# Patient Record
Sex: FEMALE | Race: WHITE | URBAN - METROPOLITAN AREA
[De-identification: names, ages, dates, MRNs, and addresses within clinical notes are randomized per-mention and may not be internally consistent; named-entity substitution may affect disease eponyms.]

---

## 2017-04-04 ENCOUNTER — OFFICE VISIT (OUTPATIENT)
Dept: ORTHOPEDIC SURGERY | Age: 42
End: 2017-04-04

## 2017-04-04 VITALS
HEART RATE: 62 BPM | DIASTOLIC BLOOD PRESSURE: 60 MMHG | BODY MASS INDEX: 23.57 KG/M2 | TEMPERATURE: 98.1 F | HEIGHT: 63 IN | SYSTOLIC BLOOD PRESSURE: 96 MMHG | WEIGHT: 133 LBS

## 2017-04-04 DIAGNOSIS — S53.105A ELBOW DISLOCATION, LEFT, INITIAL ENCOUNTER: Primary | ICD-10-CM

## 2017-04-04 DIAGNOSIS — S52.042A CLOSED DISPLACED FRACTURE OF CORONOID PROCESS OF LEFT ULNA, INITIAL ENCOUNTER: ICD-10-CM

## 2017-04-04 PROCEDURE — 99204 OFFICE O/P NEW MOD 45 MIN: CPT | Performed by: ORTHOPAEDIC SURGERY

## 2023-07-11 ENCOUNTER — HOSPITAL ENCOUNTER (OUTPATIENT)
Dept: WOMENS IMAGING | Age: 48
Discharge: HOME | End: 2023-07-11

## 2023-07-11 DIAGNOSIS — Z12.31 BREAST CANCER SCREENING BY MAMMOGRAM: ICD-10-CM

## 2023-07-11 PROCEDURE — 77067 SCR MAMMO BI INCL CAD: CPT

## 2023-09-28 ENCOUNTER — TELEPHONE (OUTPATIENT)
Dept: SURGERY | Age: 48
End: 2023-09-28

## 2023-09-28 NOTE — TELEPHONE ENCOUNTER
Left VM for patient to return call to review intake and confirm appointment for 10/2/23 at 11 am in our [place] office. Also asked patient to arrive 15 minutes before appointment time if unavailable to return call.

## 2023-10-02 ENCOUNTER — OFFICE VISIT (OUTPATIENT)
Dept: SURGERY | Age: 48
End: 2023-10-02

## 2023-10-02 VITALS
RESPIRATION RATE: 18 BRPM | HEIGHT: 63 IN | WEIGHT: 148.4 LBS | HEART RATE: 60 BPM | OXYGEN SATURATION: 98 % | BODY MASS INDEX: 26.29 KG/M2

## 2023-10-02 DIAGNOSIS — N61.1 ABSCESS OF RIGHT BREAST: ICD-10-CM

## 2023-10-02 DIAGNOSIS — N61.1 LEFT BREAST ABSCESS: Primary | ICD-10-CM

## 2023-10-02 DIAGNOSIS — Z12.31 SCREENING MAMMOGRAM FOR BREAST CANCER: Primary | ICD-10-CM

## 2023-10-02 PROCEDURE — 99204 OFFICE O/P NEW MOD 45 MIN: CPT | Performed by: NURSE PRACTITIONER

## 2023-10-02 NOTE — PROGRESS NOTES
[facility]   Surgical Breast Oncology     CC: Breast Abscess    Jong Almeida is being seen at the request of  for a consultation for breast abscess. HPI: Ms. Jong Almeida is a 53 y.o. female who presents today for recurrent breast abscesses, 5-6 in the last 1.5 years, and 3 within the last 9months in the right breast; improvement with abx but recurs within a week or a few months. She reports symptoms started as a tender, firm, red area with warmth pain; notices symptoms early and gets on antibiotics quickly with good improvement. No drainage. Never required needle aspiration or I&D. No injury or trauma. No history of nipple piercings. Denies fevers/chills. No current symptoms today. Last abx treatment 3 weeks ago. She has quit smoking and significantly cut back on caffeine intake. She states that she does perform routine self breast evaluations and has not noticed any new abnormalities such as masses, skin changes, color changes, nipple discharge, or changes to the nipple-areolar complex. She has never required a breast biopsy. She has no family history of breast cancer. Family history of breast cancer in a maternal aunt. INTERVAL HISTORY:  Bilateral screening mammogram 2023:  Scattered subcentimeter masses bilaterally consistent with benign pattern. No concerning findings just malignancy. BI-RADS 2. Review of Systems    History reviewed. No pertinent past medical history. History reviewed. No pertinent surgical history.     Allergies as of 10/02/2023    (No Known Allergies)       Social History     Tobacco Use    Smoking status: Former     Years: 2     Types: Cigarettes     Quit date: 10/31/2017     Years since quittin.9   Vaping Use    Vaping Use: Never used   Substance Use Topics    Alcohol use: Yes    Drug use: No         Menstrual History:  Menarche age: 16  G10, P5  Age first live birth: 25  Breastfeed: Yes, 12-15 months each child  Menopausal state:

## 2025-02-13 ENCOUNTER — HOSPITAL ENCOUNTER (OUTPATIENT)
Dept: ULTRASOUND IMAGING | Age: 50
Discharge: HOME | End: 2025-02-13
Payer: COMMERCIAL

## 2025-02-13 ENCOUNTER — HOSPITAL ENCOUNTER (OUTPATIENT)
Dept: WOMENS IMAGING | Age: 50
Discharge: HOME | End: 2025-02-13
Payer: COMMERCIAL

## 2025-02-13 ENCOUNTER — TRANSCRIBE ORDERS (OUTPATIENT)
Dept: ADMINISTRATIVE | Age: 50
End: 2025-02-13

## 2025-02-13 VITALS — BODY MASS INDEX: 24.8 KG/M2 | WEIGHT: 140 LBS | HEIGHT: 63 IN

## 2025-02-13 DIAGNOSIS — R92.8 ABNORMAL MAMMOGRAM: Primary | ICD-10-CM

## 2025-02-13 DIAGNOSIS — N63.21 MASS OF UPPER OUTER QUADRANT OF LEFT BREAST: ICD-10-CM

## 2025-02-13 PROCEDURE — G0279 TOMOSYNTHESIS, MAMMO: HCPCS

## 2025-02-13 PROCEDURE — 76642 ULTRASOUND BREAST LIMITED: CPT

## 2025-02-13 NOTE — PROGRESS NOTES
This RN spoke to patient regarding recommendation for breast biopsy. RN and patient discussed medical history, allergies, and current medication list. Biopsy procedure explained to patient and printed education and instructions were provided as well. Patient denies any further questions at this time.       Reviewed pre and post biopsy instructions/information with patient.  Pt verbalized understanding.      Requesting order for biopsy from Provider at this time. Request faxed to [facility] and MARI left with her office.

## 2025-02-25 ENCOUNTER — HOSPITAL ENCOUNTER (OUTPATIENT)
Dept: ULTRASOUND IMAGING | Age: 50
Discharge: HOME | End: 2025-02-25
Payer: COMMERCIAL

## 2025-02-25 DIAGNOSIS — N63.0 BREAST MASS IN FEMALE: ICD-10-CM

## 2025-02-25 DIAGNOSIS — R92.8 ABNORMAL MAMMOGRAM: ICD-10-CM

## 2025-02-25 PROCEDURE — 76642 ULTRASOUND BREAST LIMITED: CPT

## 2025-02-25 NOTE — PROGRESS NOTES
Biopsy canceled by Dr Shaffer. Area of concern not reproducible today. Pt instructed to follow up in 3 months for US of left breast. Pt verbalized understanding.

## 2025-02-25 NOTE — DISCHARGE INSTRUCTIONS
Post Breast Biopsy Discharge Instructions      NAME:  Margaret Rodas  YOB: 1975  GENDER: female      [x] Place a cold pack inside your bra for at least 3 hours, removing it every 15 minutes for 15 minutes after your biopsy. It is re-freezable.     [x] Dermabond (surgical glue) will be placed over the incision site. Do not scratch, rub, or pick at the film. It is waterproof. The film will loosen and fall off, usually within a week.    [x] The local anesthetic wears off about 2-3 hours after the biopsy. You may experience mild discomfort. For mild pain or discomfort, you may take Tylenol (Acetaminophen) as needed.    [x] You may experience slight bruising. Watch for excessive bleeding. If bleeding occurs, apply firm pressure to site for about 10 minutes or until bleeding stops.     [x] Watch for signs of infection: increased pain, redness, swelling and heat.  If this occurs, call your physician.     [x] You may resume normal activities, HOWEVER, Do not participate in any strenuous exercise for 48 hours after your biopsy, such as tennis, aerobics, weight lifting and household activities that involve use of your affected arm. Do not lift more than 5-10lbs for the next two days.    [x] You may shower in 24 hours. Do not submerge the biopsy site in water (hot tub, pool, or bath) until site is fully healed.    [x] You may resume your held medications tomorrow, unless otherwise instructed by your physician.    [x] Wear a firm fitting bra or sports bra for 24-48 hours for support and comfort, including sleep.    [x] The results of your biopsy will be available in 2-3 business days. These results will be sent to your referring physician. Your referring physician or the Nurse Navigator will call you with results.      Macie Pritchard, Nurse Navigator   Shay Gaona, Nurse Navigator  [facility]  Phone: [phone] or [phone]        [facility] Information: